# Patient Record
Sex: FEMALE | Race: WHITE
[De-identification: names, ages, dates, MRNs, and addresses within clinical notes are randomized per-mention and may not be internally consistent; named-entity substitution may affect disease eponyms.]

---

## 2019-01-02 ENCOUNTER — HOSPITAL ENCOUNTER (EMERGENCY)
Dept: HOSPITAL 25 - UCCORT | Age: 39
Discharge: HOME | End: 2019-01-02
Payer: COMMERCIAL

## 2019-01-02 VITALS — DIASTOLIC BLOOD PRESSURE: 71 MMHG | SYSTOLIC BLOOD PRESSURE: 126 MMHG

## 2019-01-02 DIAGNOSIS — J01.90: Primary | ICD-10-CM

## 2019-01-02 PROCEDURE — 99202 OFFICE O/P NEW SF 15 MIN: CPT

## 2019-01-02 PROCEDURE — G0463 HOSPITAL OUTPT CLINIC VISIT: HCPCS

## 2019-01-02 NOTE — UC
Respiratory Complaint HPI





- HPI Summary


HPI Summary: 





She is a 38-year-old female with a 2-3 week history of sinus pressure and pain 

postnasal drip bilateral earache and cough.  She denies any fever or chills.  

She has had no relief with any of the over-the-counter medicine she is tried.  

She denies any chest pain or shortness of breath.





- History of Current Complaint


Chief Complaint: UCRespiratory


Stated Complaint: SORE THROAT,EAR PAIN,COUGH


Time Seen by Provider: 01/02/19 15:36


Hx Obtained From: Patient


Hx Last Menstrual Period: 12/20/18


Onset/Duration: Gradual Onset, Lasting Days


Timing: Constant


Severity Initially: Moderate


Severity Currently: Moderate


Pain Intensity: 6


Pain Scale Used: 0-10 Numeric


Character: Cough: Nonproductive


Aggravating Factors: Nothing


Associated Signs And Symptoms: Positive: Nasal Congestion, Sinus Discomfort





- Allergies/Home Medications


Allergies/Adverse Reactions: 


 Allergies











Allergy/AdvReac Type Severity Reaction Status Date / Time


 


No Known Allergies Allergy   Verified 01/02/19 15:35











Home Medications: 


 Home Medications





Ibuprofen 600 mg PO ONCE PRN 01/02/19 [History Confirmed 01/02/19]


Norgestimate-Ethinyl Estradiol [Ortho-Cyclen] 1 tab PO BEDTIME 01/02/19 [

History Confirmed 01/02/19]











PMH/Surg Hx/FS Hx/Imm Hx


Previously Healthy: Yes





- Surgical History


Surgical History: None





- Family History


Known Family History: Positive: Hypertension, Diabetes





- Social History


Alcohol Use: Rare


Substance Use Type: None


Smoking Status (MU): Never Smoked Tobacco





Review of Systems


All Other Systems Reviewed And Are Negative: Yes


Constitutional: Positive: Negative


Skin: Positive: Negative


Eyes: Positive: Negative


ENT: Positive: Ear Ache, Nasal Discharge, Sinus Congestion, Sinus Pain/

Tenderness


Respiratory: Positive: Other


Cardiovascular: Positive: Negative


Gastrointestinal: Positive: Negative


Genitourinary: Positive: Negative


Motor: Positive: Negative


Neurovascular: Positive: Negative


Musculoskeletal: Positive: Negative


Neurological: Positive: Negative


Psychological: Positive: Negative





Physical Exam


Triage Information Reviewed: Yes


Appearance: Well-Appearing, No Pain Distress, Well-Nourished


Vital Signs: 


 Initial Vital Signs











Temp  98.6 F   01/02/19 15:29


 


Pulse  91   01/02/19 15:29


 


Resp  16   01/02/19 15:29


 


BP  126/71   01/02/19 15:29


 


Pulse Ox  97   01/02/19 15:29











Eyes: Positive: Conjunctiva Clear


ENT: Positive: Hearing grossly normal, Nasal congestion, Nasal drainage, Sinus 

tenderness, Uvula midline


Neck: Positive: Supple, Nontender, No Lymphadenopathy


Respiratory: Positive: Lungs clear, Normal breath sounds, No respiratory 

distress


Cardiovascular: Positive: RRR, No Murmur


Musculoskeletal: Positive: ROM Intact, No Edema


Neurological: Positive: Alert


Psychological Exam: Normal


Skin Exam: Normal





[facility] Diagnostic Evaluation





- Laboratory


O2 Sat by Pulse Oximetry: 97 - normal/not hypoxic





Respiratory Course/Dx





- Differential Dx/Diagnosis


Provider Diagnosis: 


 Sinusitis, acute








Discharge





- Sign-Out/Discharge


Documenting (check all that apply): Patient Departure


All imaging exams completed and their final reports reviewed: Yes





- Discharge Plan


Condition: Stable


Disposition: HOME


Prescriptions: 


Amoxicillin PO (*) [Amoxicillin 875 MG (*)] 875 mg PO BID #20 tab


Fluticasone NASAL SPRAY 50MCG* [Flonase NASAL SPRAY 50MCG*] 2 spray BOTH NARES 

BID #1 btl


Patient Education Materials:  Sinusitis (ED)


Referrals: 


ROSANNA Akbar [Primary Care Provider] - If Needed


Additional Instructions: 


saline nasal spray 


2 sprays each nostril 2x day 


use the flonase about 5 minutes after the saline nasal spray





warm compresses











- Billing Disposition and Condition


Condition: STABLE


Disposition: Home